# Patient Record
Sex: FEMALE | Race: WHITE | URBAN - METROPOLITAN AREA
[De-identification: names, ages, dates, MRNs, and addresses within clinical notes are randomized per-mention and may not be internally consistent; named-entity substitution may affect disease eponyms.]

---

## 2025-06-12 ENCOUNTER — TELEPHONE (OUTPATIENT)
Age: 35
End: 2025-06-12

## 2025-06-12 NOTE — TELEPHONE ENCOUNTER
Spoke with new patient with LMP unknown last month, .  She advised she has had 20 +HPT over the course of the last week.  She started with bleeding yesterday with mild cramping.  Currently changing pad every few hours.  Patient was advised to go to the ED for further evaluation. Pt verbalized understanding, no further questions or concerns at this time.